# Patient Record
Sex: MALE | Race: BLACK OR AFRICAN AMERICAN | NOT HISPANIC OR LATINO | Employment: FULL TIME | URBAN - METROPOLITAN AREA
[De-identification: names, ages, dates, MRNs, and addresses within clinical notes are randomized per-mention and may not be internally consistent; named-entity substitution may affect disease eponyms.]

---

## 2018-03-26 ENCOUNTER — HOSPITAL ENCOUNTER (EMERGENCY)
Facility: HOSPITAL | Age: 33
Discharge: HOME | End: 2018-03-26
Attending: EMERGENCY MEDICINE
Payer: COMMERCIAL

## 2018-03-26 VITALS
WEIGHT: 300 LBS | SYSTOLIC BLOOD PRESSURE: 143 MMHG | OXYGEN SATURATION: 99 % | BODY MASS INDEX: 45.47 KG/M2 | HEIGHT: 68 IN | TEMPERATURE: 98 F | RESPIRATION RATE: 18 BRPM | HEART RATE: 86 BPM | DIASTOLIC BLOOD PRESSURE: 80 MMHG

## 2018-03-26 DIAGNOSIS — M79.673 FOOT PAIN: Primary | ICD-10-CM

## 2018-03-26 DIAGNOSIS — M25.571 RIGHT ANKLE PAIN: ICD-10-CM

## 2018-03-26 PROCEDURE — 99283 EMERGENCY DEPT VISIT LOW MDM: CPT

## 2018-03-26 RX ORDER — NAPROXEN 500 MG/1
500 TABLET ORAL 2 TIMES DAILY WITH MEALS
Qty: 10 TABLET | Refills: 0 | Status: SHIPPED | OUTPATIENT
Start: 2018-03-26 | End: 2018-03-31

## 2018-03-26 NOTE — ED PROVIDER NOTES
Encounter Date: 3/26/2018       History     Chief Complaint   Patient presents with    Foot Pain     PT COMPLAINS OF ATRAUMATIC RIGHT FOOT PAIN FOR THE PAST WEEK     Patient is a 32 year old male who presents with right foot pain for one week. He reports PMH significant for plantar fascitis. He reports he is on his feet a lot at work and has noticed worsening pain to the right foot. He states it feels stiff after sitting for awhile and has been soaking it at night with mild relief. He has been taking aleve with no resolution of the pain. He reports prior laceration to the foot years ago but denied previous fracture. He denied swelling, erythema or warmth. He denied injury. He denied fever or chills.      The history is provided by the patient.     Review of patient's allergies indicates:  Allergies no known allergies  History reviewed. No pertinent past medical history.  History reviewed. No pertinent surgical history.  History reviewed. No pertinent family history.  Social History   Substance Use Topics    Smoking status: Current Every Day Smoker     Packs/day: 0.50    Smokeless tobacco: Not on file    Alcohol use Not on file     Review of Systems   Constitutional: Negative for activity change, appetite change, chills and fever.   HENT: Negative for congestion, rhinorrhea and sore throat.    Eyes: Negative for redness and visual disturbance.   Respiratory: Negative for cough, chest tightness and shortness of breath.    Cardiovascular: Negative for chest pain.   Gastrointestinal: Negative for abdominal pain, diarrhea, nausea and vomiting.   Genitourinary: Negative for dysuria and frequency.   Musculoskeletal: Positive for arthralgias. Negative for back pain, neck pain and neck stiffness.   Skin: Negative for rash.   Neurological: Negative for dizziness, syncope, numbness and headaches.       Physical Exam     Initial Vitals [03/26/18 0930]   BP Pulse Resp Temp SpO2   (!) 143/80 86 18 98 °F (36.7 °C) 99 %       MAP       101         Physical Exam    Constitutional: Vital signs are normal. He appears well-developed and well-nourished. He is cooperative.  Non-toxic appearance. He does not have a sickly appearance.   HENT:   Head: Normocephalic and atraumatic.   Right Ear: External ear normal.   Left Ear: External ear normal.   Nose: Nose normal.   Mouth/Throat: Oropharynx is clear and moist.   Eyes: Conjunctivae and lids are normal. Pupils are equal, round, and reactive to light.   Neck: Normal range of motion and full passive range of motion without pain. Neck supple.   Cardiovascular: Normal rate, regular rhythm and normal heart sounds. Exam reveals no gallop and no friction rub.    No murmur heard.  Pulmonary/Chest: Breath sounds normal. He has no wheezes. He has no rhonchi. He has no rales.   Musculoskeletal:        Right ankle: He exhibits no swelling. No tenderness. No lateral malleolus and no medial malleolus tenderness found.        Right foot: There is tenderness and bony tenderness. There is normal range of motion, no swelling and normal capillary refill.        Feet:    Neurological: He is alert and oriented to person, place, and time.   Skin: Skin is warm, dry and intact. No rash noted.         ED Course   Procedures  Labs Reviewed - No data to display          Medical Decision Making:   History:   Old Medical Records: I decided to obtain old medical records.  Clinical Tests:   Radiological Study: Ordered and Reviewed       APC / Resident Notes:   Urgent evaluation of a 32-year-old male who presents with atraumatic right foot pain for approximately one week.  He states the pain is worse with movement.  He denied swelling, erythema or warmth.  I doubt septic joint.  He reports taking the foot and taken ibuprofen with improvement without resolution of the pain.  He is well-appearing.  Vital signs are stable.  He has a well-healed scar noted to the dorsal aspect of the right foot.  He has tenderness.  He is  neurovascularly intact.  He has no tenderness to the plantar aspect of the foot.  I doubt plantar fasciitis.  X-ray showed no acute fracture.  We discussed the use of anti-inflammatories in addition to get support shoes while being on his feet all day at work.  He is to follow with podiatry as needed. Discussed results with patient. Return precautions given. Based on my clinical evaluation, I do not appreciate any immediate, emergent, or life threatening condition or etiology that warrants additional workup today and feel that the patient can be discharged with close follow up care.  Patient is to follow up with their primary care provider. Case was discussed with Dr. Veronica who is in agreement with the plan of care. All questions answered.                    Clinical Impression:   The primary encounter diagnosis was Foot pain. A diagnosis of Right ankle pain was also pertinent to this visit.                           Sabrina Gunderson PA-C  03/26/18 9012

## 2018-03-26 NOTE — ED NOTES
Pt reports rt dorsal foot pain denies recent injury or trauma, denies chest pain or sob, pt awake alert in no acute distress, rt foot dp pulse present sensation intact

## 2018-03-26 NOTE — ED NOTES
Teaching completed on [facility] and follow up. Patient verbalizes understanding. Patient instructed on how to wrap right ankle/foot with ace wrap and to check for cap refill to ensure ace wrap is not too tight. Patient verbalizes understanding and returns demonstration.

## 2018-03-26 NOTE — DISCHARGE INSTRUCTIONS
Use ace wrap and take medication as prescribed.  Try to rest the foot. Wear shoes with good support at work.  See your primary care provider next week.  See podiatry at [facility] or Dr. Sands listed above.  For worsening symptoms, chest pain, shortness of breath, increased abdominal pain, high grade fever, stroke or stroke like symptoms, immediately go to the nearest Emergency Room or call 911 as soon as possible.

## 2018-05-20 ENCOUNTER — HOSPITAL ENCOUNTER (EMERGENCY)
Facility: HOSPITAL | Age: 33
Discharge: HOME | End: 2018-05-20
Attending: EMERGENCY MEDICINE
Payer: COMMERCIAL

## 2018-05-20 VITALS
HEART RATE: 112 BPM | RESPIRATION RATE: 16 BRPM | HEIGHT: 68 IN | OXYGEN SATURATION: 96 % | SYSTOLIC BLOOD PRESSURE: 131 MMHG | BODY MASS INDEX: 45.47 KG/M2 | TEMPERATURE: 99 F | DIASTOLIC BLOOD PRESSURE: 76 MMHG | WEIGHT: 300 LBS

## 2018-05-20 DIAGNOSIS — S76.011A HIP STRAIN, RIGHT, INITIAL ENCOUNTER: ICD-10-CM

## 2018-05-20 DIAGNOSIS — S16.1XXA NECK STRAIN, INITIAL ENCOUNTER: ICD-10-CM

## 2018-05-20 DIAGNOSIS — S39.012A BACK STRAIN, INITIAL ENCOUNTER: ICD-10-CM
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
## 2018-05-20 PROCEDURE — 25000003 PHARM REV CODE 250: Performed by: EMERGENCY MEDICINE

## 2018-05-20 PROCEDURE — 99283 EMERGENCY DEPT VISIT LOW MDM: CPT

## 2018-05-20 RX ORDER — IBUPROFEN 400 MG/1
400 TABLET ORAL
Status: COMPLETED | OUTPATIENT
Start: 2018-05-20 | End: 2018-05-20

## 2018-05-20 RX ADMIN — IBUPROFEN 400 MG: 400 TABLET ORAL at 10:05
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
## 2018-05-21 NOTE — DISCHARGE INSTRUCTIONS
[facility]    [facility] [phone]    - Dr. Tolbert  - Dr. Campos  - Dr. Naranjo  - Dr. Flores  - Dr. Lantigua  - Dr. Rodrigez  - Dr. Amado  - Dr. Hill ([place])    [facility] [phone]    - Dr. Regalado  - Dr. Krueger  - Dr. Veloz  - Dr. Elaine    [facility]    - Dr. Lundy [phone]  - Dr. Napier [phone]  - Dr. Ma [phone]    Dr. Grider [phone]  Dr. Theodore [phone]  Dr. Holland [phone]  Dr. Lundy [phone]  Dr. Masters [phone]  Dr. Castillo [phone]  Dr. Leavitt [phone]  Dr. Flores [phone]  Dr. Jaramillo [phone]    [facility]    [facility] [phone]  Dr. Oden [phone]  Dr. Ward [phone]  Dr. Terry [phone]  Dr. Thompson [phone]  Dr. Gamino [phone]  Dr. Beckford [phone]          [facility]  - [address] [place] [address]  - [phone]    [facility]  - [address] [place] [address]  - [phone]    [facility]  - [address], [address], [place]  - [phone]    [facility]  - [address] [place] [address]  - [phone]    [facility]  - [address], [place] [address]   [phone]    [facility]  - [address] [place] [address] - [phone]    [facility]  - [address] [place] [address]  - [phone]